# Patient Record
Sex: FEMALE | Race: WHITE | Employment: FULL TIME
[De-identification: names, ages, dates, MRNs, and addresses within clinical notes are randomized per-mention and may not be internally consistent; named-entity substitution may affect disease eponyms.]

---

## 2024-03-06 ENCOUNTER — HOSPITAL ENCOUNTER (OUTPATIENT)
Facility: HOSPITAL | Age: 59
Setting detail: RECURRING SERIES
Discharge: HOME | End: 2024-03-09
Payer: OTHER GOVERNMENT

## 2024-03-06 PROCEDURE — 97535 SELF CARE MNGMENT TRAINING: CPT

## 2024-03-06 PROCEDURE — 97162 PT EVAL MOD COMPLEX 30 MIN: CPT

## 2024-03-06 PROCEDURE — 97110 THERAPEUTIC EXERCISES: CPT

## 2024-03-06 NOTE — PROGRESS NOTES
PT DAILY TREATMENT NOTE/LYMPHARM EVAL 10-18    Patient Name: Zohreh Koenig    Date: 3/6/2024    : 1965  Insurance: Payor: [employer] E[employer] / Plan: [employer] / Product Type: *No Product type* /      Patient  verified yes     Visit #   Current / Total 1 24   Time   In / Out [phone]     TREATMENT AREA =  Lymphedema, not elsewhere classified [I89.0]    SUBJECTIVE  Pain Level (0-10 scale): 1/10  []constant []intermittent []improving []worsening []no change since onset    Any medication changes, allergies to medications, adverse drug reactions, diagnosis change, or new procedure performed?: [x] No    [] Yes (see summary sheet for update)  Subjective functional status/changes:     PLOF: functionally independent, no AD, active lifestyle  Limitations to PLOF: difficulty with working at keyboard, difficulty with lifting overhead, difficulty with walking dog,   Current symptoms/Complaints: 1/10 at best with rest; 6/10 at worst with sitting at computer; pt reports they are unable to sleep through the night secondary to pain  PMHx/Surgical Hx: Hashimoto disease s/p thyroid removal; Autoimmune disease,       [x] MRM [] Lumpectomy [] ALND +/-, [] SLNB +/-, [x] Reconstruction    Cancer treatments:  Surgical intervention only     Lymphedema History: [] Primary [x] Secondary Cause: Pt was diagnosis with breast cancer 2023 went through double mastectomy on 23 with 3 lymph nodes removed on left 3.  Pt reported that she opted for reconstruction with silicone implants which was performed on 24.  Pt reported that she was recovering well after first surgery but after reconstruction she is now having numbness and tingling everywhere down both arms but worse on the left.  She reports burning in the chest and pain at sternum as well as numbness and tingling in arms and fingers.  Pt reports that if she sits too long with hands on the keyboard she has increased pain and doesn't feel good.       Treatment history: 
circumference measurements of right arm by 5 cm in order to improve pts ability for dressing and pain.               Eval status:   Circumference measurements Left (cm)  Right (cm)   Thumb 7.3 6   2nd digit  5.5 5.5   3rd digit 51 5   4th digit 4.9 4.6   5th digit 4.5 4.5   Mid hand 17 16.4   Wrist  14.4 13.9   10 cm distal elbow 18 18.4   elbow 23 22   10 cm proximal elbow 18.1 19   Shoulder 31 30   Armpits  88     Chest  97     Xyphoid process 85               6.  Patient will be able to sit in a chair typing for an 8 hour work day with out increase in left arm pain in order to to return to full time work.                Eval status: unable to sit for more than 30 min with out increase in tingling.     Frequency / Duration: Patient to be seen 2 times per week for 16 treatments; then 1 time a week for 8 treatments for a total of 24 treatments    Patient/ Caregiver education and instruction: Diagnosis, prognosis, self care, activity modification, and exercises     [x]  Plan of care has been reviewed with PTA    Certification Period: LORELEI Mathew, PT 3/6/2024 7:41 AM  ____________________________________________________________________  I certify that the above Therapy Services are being furnished while the patient is under my care. I agree with the treatment plan and certify that this therapy is necessary.    Physician's Signature:____________________________Date:_________TIME:________                                      Yulia Sanchez MD  Insurance: Payor:  EAST / Plan:  EAST / Product Type: *No Product type* /      ** Signature, Date and Time must be completed for valid certification **    [facility]  [place] [address]  Ph [phone]  Fx [phone]

## 2024-03-12 ENCOUNTER — HOSPITAL ENCOUNTER (OUTPATIENT)
Facility: HOSPITAL | Age: 59
Setting detail: RECURRING SERIES
Discharge: HOME | End: 2024-03-15
Payer: OTHER GOVERNMENT

## 2024-03-12 PROCEDURE — 97535 SELF CARE MNGMENT TRAINING: CPT

## 2024-03-12 PROCEDURE — 97110 THERAPEUTIC EXERCISES: CPT

## 2024-03-12 PROCEDURE — 97112 NEUROMUSCULAR REEDUCATION: CPT

## 2024-03-12 NOTE — PROGRESS NOTES
PHYSICAL / OCCUPATIONAL THERAPY - DAILY TREATMENT NOTE     Patient Name: Zohreh Koenig    Date: 3/12/2024    : 1965  Insurance: Payor:  EAST / Plan:  EAST / Product Type: *No Product type* /      Patient  verified  YES     Visit #   Current / Total 2 24   Time   In / Out 438 540   Pain   In / Out 0.5 0.5   Subjective Functional Status/Changes: Pt reported that she loves how the postural exercises make her feel.  She also found another \"sport bra\" that compresses higher and she feels much more comfortable in that   Changes to:  Allergies, Med Hx, Sx Hx?   no       TREATMENT AREA =  Lymphedema, not elsewhere classified [I89.0]    OBJECTIVE    Therapeutic Procedures:  Tx Min Billable or 1:1 Min (if diff from Tx Min) Procedure, Rationale, Specifics   29 29 45489 Therapeutic Exercise (timed):  increase ROM, strength, coordination, balance, and proprioception to improve patient's ability to progress to PLOF and address remaining functional goals. (see flow sheet as applicable)    Details if applicable:  self MLD, wand exercises, AROM to 90 with 2lb weight shoulder flexion/scap     8 8 27189 Neuromuscular Re-Education (timed):  improve balance, coordination, kinesthetic sense, posture, core stability and proprioception to improve patient's ability to develop conscious control of individual muscles and awareness of position of extremities in order to progress to PLOF and address remaining functional goals. (see flow sheet as applicable)    Details if applicable:  diaphragmatic breathing   25 25 04542 Self Care/Home Management (timed):  improve patient knowledge and understanding of activity modification, diagnosis/prognosis, and physical therapy expectations, procedures and progression  to improve patient's ability to progress to PLOF and address remaining functional goals.  (see flow sheet as applicable)     Details if applicable:     62 62 [facility] Totals Reminder: bill using total billable min of  Patent

## 2024-03-14 ENCOUNTER — HOSPITAL ENCOUNTER (OUTPATIENT)
Facility: HOSPITAL | Age: 59
Setting detail: RECURRING SERIES
Discharge: HOME | End: 2024-03-17
Payer: OTHER GOVERNMENT

## 2024-03-14 PROCEDURE — 97140 MANUAL THERAPY 1/> REGIONS: CPT

## 2024-03-14 PROCEDURE — 97535 SELF CARE MNGMENT TRAINING: CPT

## 2024-03-14 PROCEDURE — 97110 THERAPEUTIC EXERCISES: CPT

## 2024-03-14 NOTE — PROGRESS NOTES
to improve functional tolerance for exercise.   Eval Status: LLIS 35 % disability     4.   Patient will improve pain in left arm to 3/10 at worst to improve sitting tolerance and restore prior level of function.  Eval Status: 6/10 at worst     5.   Pt will be independent with teach back of lymphedema risk reduction techniques in order to self manage lymphedema.  Eval Status: given today      6.   Pt will have painfree left shoulder AROM to aid in functional mechanics for ADLs.  Eval Status:   Shoulder Left Right   Flexion 155 P! 160    160   ER T3 T3   IR T9 T9    3/14/24: PROGRESSING   Shoulder Left Right   Flexion 160 P! 160    160        Long Term Goals: To be accomplished in 24 treatments::  Patient will improve FOTO score by 12 points to improve functional tolerance for ADLs and IADLs.  Eval Status: FOTO 45  FOTO score = an established functional score where 100 = no disability     2.   Pt will be able to sleep in bed with out increased pain in order to return to proper sleeping arrangements   Eval Status: sleeps in recliner with 2 pillows under arms      3.   Pt will have 5/5 neal UE strength to return to goals of improved overhead reaching and lifting.  Eval Status:   Shoulder Left (1-5) Right (1-5)   Shoulder Flexion 4- 4   Shoulder ABD 3+ 4   Shoulder IR 3+ 5   Shoulder ER 3+ 4            4.   Pt will be able to zo/doff compression garment with mod ind in order to self manage lymphedema in the future.               Eval status: to be assessed at future visit            5.   Pt will decrease total circumference measurements of right arm by 5 cm in order to improve pts ability for dressing and pain.               Eval status:   Circumference measurements Left (cm)  Right (cm)   Thumb 7.3 6   2nd digit  5.5 5.5   3rd digit 51 5   4th digit 4.9 4.6   5th digit 4.5 4.5   Mid hand 17 16.4   Wrist  14.4 13.9   10 cm distal elbow 18 18.4   elbow 23 22   10 cm proximal elbow 18.1 19   Shoulder 31 30

## 2024-03-19 ENCOUNTER — HOSPITAL ENCOUNTER (OUTPATIENT)
Facility: HOSPITAL | Age: 59
Setting detail: RECURRING SERIES
Discharge: HOME | End: 2024-03-22
Payer: OTHER GOVERNMENT

## 2024-03-19 PROCEDURE — 97112 NEUROMUSCULAR REEDUCATION: CPT

## 2024-03-19 PROCEDURE — 97535 SELF CARE MNGMENT TRAINING: CPT

## 2024-03-19 PROCEDURE — 97110 THERAPEUTIC EXERCISES: CPT

## 2024-03-19 NOTE — PROGRESS NOTES
pain in left arm to 3/10 at worst to improve sitting tolerance and restore prior level of function.  Eval Status: 6/10 at worst     5.   Pt will be independent with teach back of lymphedema risk reduction techniques in order to self manage lymphedema.  Eval Status: given today      6.   Pt will have painfree left shoulder AROM to aid in functional mechanics for ADLs.  Eval Status:   Shoulder Left Right   Flexion 155 P! 160    160   ER T3 T3   IR T9 T9    3/14/24: PROGRESSING   Shoulder Left Right   Flexion 160 P! 160    160         Long Term Goals: To be accomplished in 24 treatments::  Patient will improve FOTO score by 12 points to improve functional tolerance for ADLs and IADLs.  Eval Status: FOTO 45  FOTO score = an established functional score where 100 = no disability     2.   Pt will be able to sleep in bed with out increased pain in order to return to proper sleeping arrangements   Eval Status: sleeps in recliner with 2 pillows under arms      3.   Pt will have 5/5 neal UE strength to return to goals of improved overhead reaching and lifting.  Eval Status:   Shoulder Left (1-5) Right (1-5)   Shoulder Flexion 4- 4   Shoulder ABD 3+ 4   Shoulder IR 3+ 5   Shoulder ER 3+ 4            4.   Pt will be able to zo/doff compression garment with mod ind in order to self manage lymphedema in the future.               Eval status: to be assessed at future visit            5.   Pt will decrease total circumference measurements of right arm by 5 cm in order to improve pts ability for dressing and pain.               Eval status:   Circumference measurements Left (cm)  Right (cm)   Thumb 7.3 6   2nd digit  5.5 5.5   3rd digit 51 5   4th digit 4.9 4.6   5th digit 4.5 4.5   Mid hand 17 16.4   Wrist  14.4 13.9   10 cm distal elbow 18 18.4   elbow 23 22   10 cm proximal elbow 18.1 19   Shoulder 31 30   Armpits  88     Chest  97     Xyphoid process 85       3/19/24  Circumference measurements Left (cm)  Right

## 2024-03-21 ENCOUNTER — HOSPITAL ENCOUNTER (OUTPATIENT)
Facility: HOSPITAL | Age: 59
Setting detail: RECURRING SERIES
Discharge: HOME | End: 2024-03-24
Payer: OTHER GOVERNMENT

## 2024-03-21 PROCEDURE — 97112 NEUROMUSCULAR REEDUCATION: CPT

## 2024-03-21 PROCEDURE — 97110 THERAPEUTIC EXERCISES: CPT

## 2024-03-21 PROCEDURE — 97535 SELF CARE MNGMENT TRAINING: CPT

## 2024-03-21 PROCEDURE — 97140 MANUAL THERAPY 1/> REGIONS: CPT

## 2024-03-21 NOTE — PROGRESS NOTES
PHYSICAL / OCCUPATIONAL THERAPY - DAILY TREATMENT NOTE     Patient Name: Zohreh Koenig    Date: 3/21/2024    : 1965  Insurance: Payor:  EAST / Plan:  EAST / Product Type: *No Product type* /      Patient  verified Yes     Visit #   Current / Total 5 24   Time   In / Out 426 537   Pain   In / Out 0 0   Subjective Functional Status/Changes: Pt reported that she had no pain today and worked all day   Changes to:  Allergies, Med Hx, Sx Hx?   no       TREATMENT AREA =  Lymphedema, not elsewhere classified [I89.0]    OBJECTIVE    Therapeutic Procedures:  Tx Min Billable or 1:1 Min (if diff from Tx Min) Procedure, Rationale, Specifics   27 27 01279 Therapeutic Exercise (timed):  increase ROM, strength, coordination, balance, and proprioception to improve patient's ability to progress to PLOF and address remaining functional goals. (see flow sheet as applicable)    Details if applicable:  self MLD, wand exercises, AROM, beach chair     13 13 42335 Neuromuscular Re-Education (timed):  improve balance, coordination, kinesthetic sense, posture, core stability and proprioception to improve patient's ability to develop conscious control of individual muscles and awareness of position of extremities in order to progress to PLOF and address remaining functional goals. (see flow sheet as applicable)    Details if applicable:  rows, ext, serratus punch (scapular stabilization   8 8 09114 Self Care/Home Management (timed):  improve patient knowledge and understanding of physical therapy expectations, procedures and progression and long term management  to improve patient's ability to progress to PLOF and address remaining functional goals.  (see flow sheet as applicable)     Details if applicable:  education long ter management    23 23 11164 Manual Therapy (timed):  decrease pain, increase ROM, increase tissue extensibility, and decrease edema to improve patient's ability to progress to PLOF and address

## 2024-03-26 ENCOUNTER — HOSPITAL ENCOUNTER (OUTPATIENT)
Facility: HOSPITAL | Age: 59
Setting detail: RECURRING SERIES
Discharge: HOME | End: 2024-03-29
Payer: OTHER GOVERNMENT

## 2024-03-26 PROCEDURE — 97140 MANUAL THERAPY 1/> REGIONS: CPT

## 2024-03-26 PROCEDURE — 97535 SELF CARE MNGMENT TRAINING: CPT

## 2024-03-26 PROCEDURE — 97110 THERAPEUTIC EXERCISES: CPT

## 2024-03-26 NOTE — PROGRESS NOTES
PHYSICAL / OCCUPATIONAL THERAPY - DAILY TREATMENT NOTE     Patient Name: Zohreh Koenig    Date: 3/26/2024    : 1965  Insurance: Payor:  EAST / Plan:  EAST / Product Type: *No Product type* /      Patient  verified Yes     Visit #   Current / Total 6 24   Time   In / Out 430 510   Pain   In / Out 0 0   Subjective Functional Status/Changes: Pt reported that she has an appointments on  for fitting her garments.  Pt reported that she is sleeping in her bed now.    Changes to:  Allergies, Med Hx, Sx Hx?   no       TREATMENT AREA =  Lymphedema, not elsewhere classified [I89.0]    OBJECTIVE    Therapeutic Procedures:  Tx Min Billable or 1:1 Min (if diff from Tx Min) Procedure, Rationale, Specifics   8 8 37887 Therapeutic Exercise (timed):  increase ROM, strength, coordination, balance, and proprioception to improve patient's ability to progress to PLOF and address remaining functional goals. (see flow sheet as applicable)    Details if applicable:        16218 Self Care/Home Management (timed):  improve patient knowledge and understanding of activity modification and diagnosis/prognosis  to improve patient's ability to progress to PLOF and address remaining functional goals.  (see flow sheet as applicable)    Details if applicable:      92670 Manual Therapy (timed):  decrease pain, increase ROM, increase tissue extensibility, and decrease edema to improve patient's ability to progress to PLOF and address remaining functional goals.  The manual therapy interventions were performed at a separate and distinct time from the therapeutic activities interventions . Details:  long thoracic duct, short neck sequence, right axial nodes, left inguinal nodes, left to right axial anastomosis, left axial-inguinal anastomosis, abdominal sequence, left upper arm, using semi circles, pumps and scoops         Details if applicable:     40 40 [facility] Totals Reminder: bill using total billable min

## 2024-03-28 ENCOUNTER — APPOINTMENT (OUTPATIENT)
Facility: HOSPITAL | Age: 59
End: 2024-03-28
Payer: OTHER GOVERNMENT

## 2024-03-28 ENCOUNTER — TELEPHONE (OUTPATIENT)
Facility: HOSPITAL | Age: 59
End: 2024-03-28

## 2024-04-02 ENCOUNTER — HOSPITAL ENCOUNTER (OUTPATIENT)
Facility: HOSPITAL | Age: 59
Setting detail: RECURRING SERIES
Discharge: HOME | End: 2024-04-05
Payer: OTHER GOVERNMENT

## 2024-04-02 PROCEDURE — 97110 THERAPEUTIC EXERCISES: CPT

## 2024-04-02 PROCEDURE — 97140 MANUAL THERAPY 1/> REGIONS: CPT

## 2024-04-02 PROCEDURE — 97112 NEUROMUSCULAR REEDUCATION: CPT

## 2024-04-02 NOTE — PROGRESS NOTES
18.4   elbow 23 22   10 cm proximal elbow 18.1 19   Shoulder 31 30   Armpits  88     Chest  97     Xyphoid process 85                  3/19/24  Circumference measurements Left (cm)  Right (cm)   Thumb 5.8 6   2nd digit  5.3 5.5   3rd digit 4.8 5   4th digit 4.5 4.6   5th digit 4.3 4.5   Mid hand 16.8 16.4   Wrist  14.1 13.9   10 cm distal elbow 18.4 18.4   elbow 23 22   10 cm proximal elbow 17 19   Shoulder 30 30   Armpits  89     Chest  97     Xyphoid process 84.5               6.  Patient will be able to sit in a chair typing for an 8 hour work day with out increase in left arm pain in order to to return to full time work.                Eval status: unable to sit for more than 30 min with out increase in tingling.     PLAN  Yes  Continue plan of care  []  Upgrade activities as tolerated  []  Discharge due to :  []  Other:    Alina Mathew PT    4/2/2024    3:28 PM    Future Appointments   Date Time Provider Department Center   4/2/2024  4:30 PM Slack, Alina, PT [facility]   4/4/2024  3:50 PM Slack, Alina, PT [facility]   4/9/2024  3:10 PM Slack, Alina, PT [facility]   4/11/2024  3:50 PM Slack, Alina, PT [facility]   4/16/2024  3:50 PM Slack, Alina, PT [facility]   4/18/2024  4:30 PM Slack, Alina, PT [facility]   4/23/2024  3:50 PM Slack, Alina, PT [facility]   4/25/2024  4:30 PM Slack, Alina, PT [facility]   4/30/2024  3:50 PM Slack, Alina, PT [facility]   5/2/2024  3:50 PM Slack, Alina, PT [facility]   5/7/2024  3:50 PM Slack, Alina, PT [facility]   5/14/2024  3:50 PM Slack, Alina, PT [facility]   5/21/2024  3:50 PM Alina Mathew, PT [facility]   5/28/2024  3:50 PM Alina Mathew, PT [facility]   6/4/2024  3:50 PM Alina Mathew, PT [facility]   6/11/2024  3:50 PM Alina Mathew, PT [facility]   6/18/2024  3:50 PM Alina Mathew, PT [facility]   6/25/2024  3:50 PM Alina Mathew, PT [facility]

## 2024-04-04 ENCOUNTER — TELEPHONE (OUTPATIENT)
Facility: HOSPITAL | Age: 59
End: 2024-04-04

## 2024-04-04 ENCOUNTER — APPOINTMENT (OUTPATIENT)
Facility: HOSPITAL | Age: 59
End: 2024-04-04
Payer: OTHER GOVERNMENT

## 2024-04-09 ENCOUNTER — APPOINTMENT (OUTPATIENT)
Facility: HOSPITAL | Age: 59
End: 2024-04-09
Payer: OTHER GOVERNMENT

## 2024-04-11 ENCOUNTER — HOSPITAL ENCOUNTER (OUTPATIENT)
Facility: HOSPITAL | Age: 59
Setting detail: RECURRING SERIES
Discharge: HOME | End: 2024-04-14
Payer: OTHER GOVERNMENT

## 2024-04-11 PROCEDURE — 97140 MANUAL THERAPY 1/> REGIONS: CPT

## 2024-04-11 PROCEDURE — 97530 THERAPEUTIC ACTIVITIES: CPT

## 2024-04-11 NOTE — PROGRESS NOTES
[facility]  [address] [place] [address]  Ph [phone]  Fx [phone]    Physical Therapy Progress Note  Patient name: Zohreh Koenig Start of Care: 3/6/2024   Referral source: Yulia Sanchez MD : 1965               Medical Diagnosis: Lymphedema, not elsewhere classified [I89.0]    Onset Date:24               Treatment Diagnosis: I89.0 Lymphedema, not elsewhere classified     Prior Hospitalization: see medical history Provider#: [id number]   Medications: Verified on Patient summary List   Comorbidities: Hashimoto disease s/p thyroid removal; Autoimmune disease,    Prior Level of Function: functionally independent, no AD, active lifestyle     Visits from Start of Care: 8    Missed Visits: 2    Updated Goals/Measure of Progress: To be achieved in 24 treatments:    Short Term Goals: To be accomplished in 12 treatments:  Patient will be independent and compliant with HEP to progress toward goals and restore functional mobility.   Eval Status: gave HEP today for posture  PN 3/26/24: pt reported daily performance GOAL MET            2.  Patient will be independent and compliant with Self MLD to progress toward goals and improve independent management of Lymphedema.     Eval Status: to be issued at next visit  PN 24: reviewed today      3.   Patient will improve LLIS score by 10 % to improve functional tolerance for exercise.   Eval Status: LLIS 35 % disability  PN 24: 19 % GOAL MET      4.   Patient will improve pain in left arm to 3/10 at worst to improve sitting tolerance and restore prior level of function.  Eval Status: 6/10 at worst  PN 3/21/24: 1/10 at worst GOAL MET      5.   Pt will be independent with teach back of lymphedema risk reduction techniques in order to self manage lymphedema.  Eval Status: given today   PN 24: reviewed today      6.   Pt will have painfree left shoulder AROM to aid in functional mechanics for ADLs.  Eval 
4/11/24: 19 % GOAL MET      4.   Patient will improve pain in left arm to 3/10 at worst to improve sitting tolerance and restore prior level of function.  Eval Status: 6/10 at worst  PN 3/21/24: 1/10 at worst GOAL MET      5.   Pt will be independent with teach back of lymphedema risk reduction techniques in order to self manage lymphedema.  Eval Status: given today   PN 4/11/24: reviewed today      6.   Pt will have painfree left shoulder AROM to aid in functional mechanics for ADLs.  Eval Status:   Shoulder Left Right   Flexion 155 P! 160    160   ER T3 T3   IR T9 T9    PN 4/11/24: Progressing   Shoulder Left   Flexion 160 P!            Long Term Goals: To be accomplished in 24 treatments::  Patient will improve FOTO score by 12 points to improve functional tolerance for ADLs and IADLs.  Eval Status: FOTO 45  PN 4/11/24: to be taken at future date   FOTO score = an established functional score where 100 = no disability     2.   Pt will be able to sleep in bed with out increased pain in order to return to proper sleeping arrangements   Eval Status: sleeps in recliner with 2 pillows under arms   PN 4/2/24: Pt reports sleeping in bed with body pillow GOAL MET      3.   Pt will have 5/5 neal UE strength to return to goals of improved overhead reaching and lifting.  Eval Status:   Shoulder Left (1-5) Right (1-5) Left 4/11/24 Right 4/11/24   Shoulder Flexion 4- 4 4 4   Shoulder ABD 3+ 4 3+ 4   Shoulder IR 3+ 5 4 5   Shoulder ER 3+ 4 3+ 4   PN 4/11/24: progressing            4.   Pt will be able to zo/doff compression garment with mod ind in order to self manage lymphedema in the future.               Eval status: to be assessed at future visit   PN 4/11/24: is getting fit on Tuesday 5.   Pt will decrease total circumference measurements of right arm by 5 cm in order to improve pts ability for dressing and pain.               Eval status:   Circumference measurements Left (cm)  Right (cm)

## 2024-04-16 ENCOUNTER — APPOINTMENT (OUTPATIENT)
Facility: HOSPITAL | Age: 59
End: 2024-04-16
Payer: OTHER GOVERNMENT

## 2024-04-16 ENCOUNTER — TELEPHONE (OUTPATIENT)
Facility: HOSPITAL | Age: 59
End: 2024-04-16

## 2024-04-16 NOTE — TELEPHONE ENCOUNTER
Pt called to cxl due to meeting with [facility] at [facility] in [facility], and does not think she will be back in time for her appt

## 2024-04-18 ENCOUNTER — HOSPITAL ENCOUNTER (OUTPATIENT)
Facility: HOSPITAL | Age: 59
Setting detail: RECURRING SERIES
Discharge: HOME | End: 2024-04-21
Payer: OTHER GOVERNMENT

## 2024-04-18 PROCEDURE — 97140 MANUAL THERAPY 1/> REGIONS: CPT

## 2024-04-18 PROCEDURE — 97535 SELF CARE MNGMENT TRAINING: CPT

## 2024-04-18 PROCEDURE — 97110 THERAPEUTIC EXERCISES: CPT

## 2024-04-18 NOTE — PROGRESS NOTES
PHYSICAL / OCCUPATIONAL THERAPY - DAILY TREATMENT NOTE     Patient Name: Zohreh Koenig    Date: 2024    : 1965  Insurance: Payor: [employer] EAST / Plan: [employer] EAST / Product Type: *No Product type* /      Patient  verified Yes     Visit #   Current / Total 9 24   Time   In / Out 430 534   Pain   In / Out 3 0   Subjective Functional Status/Changes: Pt reported that she went to [facility] and wanted to know about lymphatic pump.  She also wanted to know about other sleeves and gloves that she can get.    Changes to:  Allergies, Med Hx, Sx Hx?   no       TREATMENT AREA =  Lymphedema, not elsewhere classified [I89.0]    OBJECTIVE    Therapeutic Procedures:  Tx Min Billable or 1:1 Min (if diff from Tx Min) Procedure, Rationale, Specifics   10 10 30185 Therapeutic Exercise (timed):  increase ROM, strength, coordination, balance, and proprioception to improve patient's ability to progress to PLOF and address remaining functional goals. (see flow sheet as applicable)    Details if applicable:        91859 Self Care/Home Management (timed):  improve patient knowledge and understanding of pain reducing techniques, posture/ergonomics, diagnosis/prognosis, physical therapy expectations, procedures and progression, and long term lymphedema management  to improve patient's ability to progress to PLOF and address remaining functional goals.  (see flow sheet as applicable)    Details if applicable:      17456 Manual Therapy (timed):  decrease pain, increase ROM, increase tissue extensibility, and decrease edema to improve patient's ability to progress to PLOF and address remaining functional goals.  The manual therapy interventions were performed at a separate and distinct time from the therapeutic activities interventions . Details:  long thoracic duct, short neck sequence, right axial nodes, left inguinal nodes, left to right axial anastomosis, left axial-inguinal anastomosis, abdominal sequence,

## 2024-04-23 ENCOUNTER — HOSPITAL ENCOUNTER (OUTPATIENT)
Facility: HOSPITAL | Age: 59
Setting detail: RECURRING SERIES
Discharge: HOME | End: 2024-04-26
Payer: OTHER GOVERNMENT

## 2024-04-23 PROCEDURE — 97110 THERAPEUTIC EXERCISES: CPT

## 2024-04-23 PROCEDURE — 97140 MANUAL THERAPY 1/> REGIONS: CPT

## 2024-04-23 NOTE — PROGRESS NOTES
aid in functional mechanics for ADLs.  Eval Status:   Shoulder Left Right   Flexion 155 P! 160    160   ER T3 T3   IR T9 T9    PN 4/11/24: Progressing   Shoulder Left   Flexion 160 P!            Long Term Goals: To be accomplished in 24 treatments::  Patient will improve FOTO score by 12 points to improve functional tolerance for ADLs and IADLs.  Eval Status: FOTO 45  PN 4/11/24: to be taken at future date   FOTO score = an established functional score where 100 = no disability     2.   Pt will be able to sleep in bed with out increased pain in order to return to proper sleeping arrangements   Eval Status: sleeps in recliner with 2 pillows under arms   PN 4/2/24: Pt reports sleeping in bed with body pillow GOAL MET      3.   Pt will have 5/5 neal UE strength to return to goals of improved overhead reaching and lifting.  Eval Status:   Shoulder Left (1-5) Right (1-5) Left 4/11/24 Right 4/11/24   Shoulder Flexion 4- 4 4 4   Shoulder ABD 3+ 4 3+ 4   Shoulder IR 3+ 5 4 5   Shoulder ER 3+ 4 3+ 4   PN 4/11/24: progressing            4.   Pt will be able to zo/doff compression garment with mod ind in order to self manage lymphedema in the future.               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            5.   Pt will decrease total circumference measurements of right arm by 5 cm in order to improve pts ability for dressing and pain.               Eval status:   Circumference measurements Left (cm)  Right (cm)   Thumb 7.3 6   2nd digit  5.5 5.5   3rd digit 5.1 5   4th digit 4.9 4.6   5th digit 4.5 4.5   Mid hand 17 16.4   Wrist  14.4 13.9   10 cm distal elbow 18 18.4   elbow 23 22   10 cm proximal elbow 28.1 29   Shoulder 31 30   Armpits  88     Chest  97     Xyphoid process 85                  PN 4/11/24: progressing   Circumference measurements Left (cm)  Right (cm)   Thumb 5.2 6   2nd

## 2024-04-25 ENCOUNTER — HOSPITAL ENCOUNTER (OUTPATIENT)
Facility: HOSPITAL | Age: 59
Setting detail: RECURRING SERIES
Discharge: HOME | End: 2024-04-28
Payer: OTHER GOVERNMENT

## 2024-04-25 PROCEDURE — 97110 THERAPEUTIC EXERCISES: CPT

## 2024-04-25 PROCEDURE — 97140 MANUAL THERAPY 1/> REGIONS: CPT

## 2024-04-25 NOTE — PROGRESS NOTES
to aid in functional mechanics for ADLs.  Eval Status:   Shoulder Left Right   Flexion 155 P! 160    160   ER T3 T3   IR T9 T9    PN 4/11/24: Progressing   Shoulder Left   Flexion 160 P!            Long Term Goals: To be accomplished in 24 treatments::  Patient will improve FOTO score by 12 points to improve functional tolerance for ADLs and IADLs.  Eval Status: FOTO 45  PN 4/11/24: to be taken at future date   FOTO score = an established functional score where 100 = no disability     2.   Pt will be able to sleep in bed with out increased pain in order to return to proper sleeping arrangements   Eval Status: sleeps in recliner with 2 pillows under arms   PN 4/2/24: Pt reports sleeping in bed with body pillow GOAL MET      3.   Pt will have 5/5 neal UE strength to return to goals of improved overhead reaching and lifting.  Eval Status:   Shoulder Left (1-5) Right (1-5) Left 4/11/24 Right 4/11/24   Shoulder Flexion 4- 4 4 4   Shoulder ABD 3+ 4 3+ 4   Shoulder IR 3+ 5 4 5   Shoulder ER 3+ 4 3+ 4   PN 4/11/24: progressing            4.   Pt will be able to zo/doff compression garment with mod ind in order to self manage lymphedema in the future.               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            5.   Pt will decrease total circumference measurements of right arm by 5 cm in order to improve pts ability for dressing and pain.               Eval status:   Circumference measurements Left (cm)  Right (cm)   Thumb 7.3 6   2nd digit  5.5 5.5   3rd digit 5.1 5   4th digit 4.9 4.6   5th digit 4.5 4.5   Mid hand 17 16.4   Wrist  14.4 13.9   10 cm distal elbow 18 18.4   elbow 23 22   10 cm proximal elbow 28.1 29   Shoulder 31 30   Armpits  88     Chest  97     Xyphoid process 85                  PN 4/11/24: progressing   Circumference measurements Left (cm)  Right (cm)   Thumb 5.2 6

## 2024-04-30 ENCOUNTER — HOSPITAL ENCOUNTER (OUTPATIENT)
Facility: HOSPITAL | Age: 59
Setting detail: RECURRING SERIES
Discharge: HOME | End: 2024-05-03
Payer: OTHER GOVERNMENT

## 2024-04-30 PROCEDURE — 97530 THERAPEUTIC ACTIVITIES: CPT

## 2024-04-30 PROCEDURE — 97140 MANUAL THERAPY 1/> REGIONS: CPT

## 2024-04-30 NOTE — PROGRESS NOTES
PHYSICAL / OCCUPATIONAL THERAPY - DAILY TREATMENT NOTE     Patient Name: Zohreh Koenig    Date: 2024    : 1965  Insurance: Payor:  EAST / Plan: [employer] EAST / Product Type: *No Product type* /      Patient  verified Yes     Visit #   Current / Total 12 24   Time   In / Out 351 435   Pain   In / Out .5 0   Subjective Functional Status/Changes: Pt reported that she had her garments in place when she was working this weekend and she started having increased numbness and tingling after working all day but exercises helped her feel better.  Pt also reported that she is having right lower quadrant pain after she urinated last night and it didn't go away.  PT instructed pt to see MD pt stated she is going to go to urgent care. PT educated to go to ER with increase in pain.     Changes to:  Allergies, Med Hx, Sx Hx?   yes       TREATMENT AREA =  Lymphedema, not elsewhere classified [I89.0]    OBJECTIVE  Therapeutic Procedures:  Tx Min Billable or 1:1 Min (if diff from Tx Min) Procedure, Rationale, Specifics   5 5 54810 Therapeutic Exercise (timed):  increase ROM, strength, coordination, balance, and proprioception to improve patient's ability to progress to PLOF and address remaining functional goals. (see flow sheet as applicable)    Details if applicable:       10 10 02349 Therapeutic Activity (timed):  use of dynamic activities replicating functional movements to increase ROM, strength, coordination, balance, and proprioception in order to improve patient's ability to progress to PLOF and address remaining functional goals.  (see flow sheet as applicable)    Details if applicable:  garment donning doffing    29 77 [id number] Manual Therapy (timed):  decrease pain, increase ROM, increase tissue extensibility, and decrease edema to improve patient's ability to progress to PLOF and address remaining functional goals.  The manual therapy interventions were performed at a separate and distinct time from

## 2024-05-02 ENCOUNTER — HOSPITAL ENCOUNTER (OUTPATIENT)
Facility: HOSPITAL | Age: 59
Setting detail: RECURRING SERIES
End: 2024-05-02
Payer: OTHER GOVERNMENT

## 2024-05-02 ENCOUNTER — TELEPHONE (OUTPATIENT)
Facility: HOSPITAL | Age: 59
End: 2024-05-02

## 2024-05-02 NOTE — TELEPHONE ENCOUNTER
Pt called and canceled visit for today due to having an Ultrasound this afternoon for her abdomen pain    Sonogram not done

## 2024-05-06 ENCOUNTER — TELEPHONE (OUTPATIENT)
Facility: HOSPITAL | Age: 59
End: 2024-05-06

## 2024-05-07 ENCOUNTER — APPOINTMENT (OUTPATIENT)
Facility: HOSPITAL | Age: 59
End: 2024-05-07
Payer: OTHER GOVERNMENT

## 2024-05-14 ENCOUNTER — HOSPITAL ENCOUNTER (OUTPATIENT)
Facility: HOSPITAL | Age: 59
Setting detail: RECURRING SERIES
Discharge: HOME | End: 2024-05-17
Payer: OTHER GOVERNMENT

## 2024-05-14 PROCEDURE — 97110 THERAPEUTIC EXERCISES: CPT

## 2024-05-14 PROCEDURE — 97140 MANUAL THERAPY 1/> REGIONS: CPT

## 2024-05-14 PROCEDURE — 97535 SELF CARE MNGMENT TRAINING: CPT

## 2024-05-14 NOTE — PROGRESS NOTES
[facility]  [address] [place] [address]  Ph [phone]  Fx [phone]    Physical Therapy Progress Note  Patient name: Zohreh Koenig Start of Care: 3/6/2024   Referral source: Yulia Sanchez MD : 1965               Medical Diagnosis: Lymphedema, not elsewhere classified [I89.0]    Onset Date:24               Treatment Diagnosis: I89.0 Lymphedema, not elsewhere classified     Prior Hospitalization: see medical history Provider#: [id number]   Medications: Verified on Patient summary List   Comorbidities: Hashimoto disease s/p thyroid removal; Autoimmune disease,    Prior Level of Function: functionally independent, no AD, active lifestyle     Visits from Start of Care: 13    Missed Visits: 5    Updated Goals/Measure of Progress: To be achieved in 24 treatments:    Short Term Goals: To be accomplished in 12 treatments:  Patient will be independent and compliant with HEP to progress toward goals and restore functional mobility.   Eval Status: gave HEP today for posture  PN 3/26/24: pt reported daily performance GOAL MET            2.  Patient will be independent and compliant with Self MLD to progress toward goals and improve independent management of Lymphedema.     Eval Status: to be issued at next visit  PN 24: reviewed today   PN 24: Pt able to perform and PT gave spot treatments during the day while Pt uses compression      3.   Patient will improve LLIS score by 10 % to improve functional tolerance for exercise.   Eval Status: LLIS 35 % disability  PN 24: 19 % GOAL MET      4.   Patient will improve pain in left arm to 3/10 at worst to improve sitting tolerance and restore prior level of function.  Eval Status: 6/10 at worst  PN 3/21/24: 1/10 at worst GOAL MET      5.   Pt will be independent with teach back of lymphedema risk reduction techniques in order to self manage lymphedema.  Eval Status: given today   PN 24: reviewed today

## 2024-05-14 NOTE — PROGRESS NOTES
PHYSICAL / OCCUPATIONAL THERAPY - DAILY TREATMENT NOTE     Patient Name: Zohreh Koenig    Date: 2024    : 1965  Insurance: Payor: [employer] EAST / Plan:  EAST PRIME / Product Type: *No Product type* /      Patient  verified Yes     Visit #   Current / Total 13 24   Time   In / Out 355 435   Pain   In / Out 0 0   Subjective Functional Status/Changes: Pt reported that [facility] stated that she can't get garments until her MD comes back from maternity leave.    Changes to:  Allergies, Med Hx, Sx Hx?   no       TREATMENT AREA =  Lymphedema, not elsewhere classified [I89.0]    OBJECTIVE    Therapeutic Procedures:  Tx Min Billable or 1:1 Min (if diff from Tx Min) Procedure, Rationale, Specifics   8 8 83866 Therapeutic Exercise (timed):  increase ROM, strength, coordination, balance, and proprioception to improve patient's ability to progress to PLOF and address remaining functional goals. (see flow sheet as applicable)    Details if applicable:  added lat pull downs on wall today       77236 Manual Therapy (timed):  decrease pain, increase ROM, increase tissue extensibility, and decrease edema to improve patient's ability to progress to PLOF and address remaining functional goals.  The manual therapy interventions were performed at a separate and distinct time from the therapeutic activities interventions . Details: short neck sequence, right axial nodes, mid sagittal axial anastomosis, left axial nodes abdominal sequence, left upper back, left chest, using semi circles, pumps and scoops          Details if applicable:     10 10 13276 Self Care/Home Management (timed):  improve patient knowledge and understanding of diagnosis/prognosis, physical therapy expectations, procedures and progression, and long term management of lymphedema   to improve patient's ability to progress to PLOF and address remaining functional goals.  (see flow sheet as applicable)     Details if applicable:     40

## 2024-05-21 ENCOUNTER — HOSPITAL ENCOUNTER (OUTPATIENT)
Facility: HOSPITAL | Age: 59
Setting detail: RECURRING SERIES
Discharge: HOME | End: 2024-05-24
Payer: OTHER GOVERNMENT

## 2024-05-21 PROCEDURE — 97530 THERAPEUTIC ACTIVITIES: CPT

## 2024-05-21 PROCEDURE — 97140 MANUAL THERAPY 1/> REGIONS: CPT

## 2024-05-21 PROCEDURE — 97112 NEUROMUSCULAR REEDUCATION: CPT

## 2024-05-21 NOTE — PROGRESS NOTES
PHYSICAL / OCCUPATIONAL THERAPY - DAILY TREATMENT NOTE     Patient Name: Zohreh Koenig    Date: 2024    : 1965  Insurance: Payor:  EAST / Plan:  EAST PRIME / Product Type: *No Product type* /      Patient  verified Yes     Visit #   Current / Total 14 24   Time   In / Out 354 435   Pain   In / Out 0 0   Subjective Functional Status/Changes: Pt reported that her exercises are working to help her pain    Changes to:  Allergies, Med Hx, Sx Hx?   no       TREATMENT AREA =  Lymphedema, not elsewhere classified [I89.0]    OBJECTIVE    Therapeutic Procedures:  Tx Min Billable or 1:1 Min (if diff from Tx Min) Procedure, Rationale, Specifics   10 10 04237 Therapeutic Activity (timed):  use of dynamic activities replicating functional movements to increase ROM, strength, coordination, balance, and proprioception in order to improve patient's ability to progress to PLOF and address remaining functional goals.  (see flow sheet as applicable)    Details if applicable:  garment education donning and doffing bra      23  25307 Manual Therapy (timed):  decrease pain, increase ROM, increase tissue extensibility, and decrease edema to improve patient's ability to progress to PLOF and address remaining functional goals.  The manual therapy interventions were performed at a separate and distinct time from the therapeutic activities interventions . Details:  short neck sequence, right axial nodes, mid sagittal axial anastomosis, left axial nodes abdominal sequence, left upper back, left chest, using semi circles, pumps and scoops     Details if applicable:     8  32130 Neuromuscular Re-Education (timed):  improve balance, coordination, kinesthetic sense, posture, core stability and proprioception to improve patient's ability to develop conscious control of individual muscles and awareness of position of extremities in order to progress to PLOF and address remaining functional goals. (see flow sheet as

## 2024-05-28 ENCOUNTER — TELEPHONE (OUTPATIENT)
Facility: HOSPITAL | Age: 59
End: 2024-05-28

## 2024-05-28 ENCOUNTER — HOSPITAL ENCOUNTER (OUTPATIENT)
Facility: HOSPITAL | Age: 59
Setting detail: RECURRING SERIES
Discharge: HOME | End: 2024-05-31
Payer: OTHER GOVERNMENT

## 2024-05-28 PROCEDURE — 97140 MANUAL THERAPY 1/> REGIONS: CPT

## 2024-05-28 PROCEDURE — 97110 THERAPEUTIC EXERCISES: CPT

## 2024-05-28 PROCEDURE — 97535 SELF CARE MNGMENT TRAINING: CPT

## 2024-05-28 NOTE — TELEPHONE ENCOUNTER
[facility] c[facility] due to no auth, pt is working on getting auth, & [facility] is putting pt on 30 day medical hold

## 2024-05-28 NOTE — PROGRESS NOTES
PHYSICAL / OCCUPATIONAL THERAPY - DAILY TREATMENT NOTE     Patient Name: Zohreh Koenig    Date: 2024    : 1965  Insurance: Payor:  EAST / Plan: [employer] EAST PRIME / Product Type: *No Product type* /      Patient  verified Yes     Visit #   Current / Total 15 24   Time   In / Out 350 438   Pain   In / Out 0 0   Subjective Functional Status/Changes: Pt reported that she is having trouble getting into see a urogyn MD   Changes to:  Allergies, Med Hx, Sx Hx?   no       TREATMENT AREA =  Lymphedema, not elsewhere classified [I89.0]    OBJECTIVE    Therapeutic Procedures:  Tx Min Billable or 1:1 Min (if diff from Tx Min) Procedure, Rationale, Specifics   10 10 11283 Therapeutic Exercise (timed):  increase ROM, strength, coordination, balance, and proprioception to improve patient's ability to progress to PLOF and address remaining functional goals. (see flow sheet as applicable)    Details if applicable:  up dated HEP     28 28 55385 Manual Therapy (timed):  decrease pain, increase ROM, increase tissue extensibility, and decrease edema to improve patient's ability to progress to PLOF and address remaining functional goals.  The manual therapy interventions were performed at a separate and distinct time from the therapeutic activities interventions . Details:  short neck sequence, right axial nodes, mid sagittal axial anastomosis, left axial nodes abdominal sequence, left upper back, left chest, using semi circles, pumps and scoops    Details if applicable:     10 10 93163 Self Care/Home Management (timed):  improve patient knowledge and understanding of diagnosis/prognosis, physical therapy expectations, procedures and progression, and Long term management  to improve patient's ability to progress to PLOF and address remaining functional goals.  (see flow sheet as applicable)     Details if applicable:     48 48 [facility] Totals Reminder: bill using total billable min of TIMED therapeutic procedures

## 2024-06-04 ENCOUNTER — APPOINTMENT (OUTPATIENT)
Facility: HOSPITAL | Age: 59
End: 2024-06-04
Payer: OTHER GOVERNMENT

## 2024-06-11 ENCOUNTER — APPOINTMENT (OUTPATIENT)
Facility: HOSPITAL | Age: 59
End: 2024-06-11
Payer: OTHER GOVERNMENT

## 2024-06-18 ENCOUNTER — APPOINTMENT (OUTPATIENT)
Facility: HOSPITAL | Age: 59
End: 2024-06-18
Payer: OTHER GOVERNMENT

## 2024-06-25 ENCOUNTER — APPOINTMENT (OUTPATIENT)
Facility: HOSPITAL | Age: 59
End: 2024-06-25
Payer: OTHER GOVERNMENT

## 2024-07-01 ENCOUNTER — HOSPITAL ENCOUNTER (OUTPATIENT)
Facility: HOSPITAL | Age: 59
Setting detail: RECURRING SERIES
Discharge: HOME | End: 2024-07-04
Payer: OTHER GOVERNMENT

## 2024-07-01 PROCEDURE — 97162 PT EVAL MOD COMPLEX 30 MIN: CPT

## 2024-07-01 PROCEDURE — 97535 SELF CARE MNGMENT TRAINING: CPT

## 2024-07-01 NOTE — PROGRESS NOTES
[facility]  [address] [place] [address]  Ph [phone]  Fx [phone]    Plan of Care/ Statement of Necessity for Physical Therapy Services    Patient name: Zohreh Koenig Start of Care: 2024   Referral source: Yulia Sanchez MD : 1965    Medical Diagnosis: Other symptoms and signs involving the genitourinary system [R39.89]   Onset Date: 2023    Treatment Diagnosis: Other symptoms and signs involving the genitourinary system [R39.89]   Prior Hospitalization: see medical history Provider#: [id number]   Medications: Verified on Patient summary List     Comorbidities: Complications related to surgery and Other: 2 pregnancies, 1 emergency c section , 1 vaginal with complete episiotomy , hx of breast cancer, double mastectomy with lymph node removal 2023, reconstruction surgery 2024, left upper extremity cellulitis, Hashimoto's, chronic eczema, varicose veins removed on right leg (), thyroid removal (benign tumor)      Prior Level of Function: decreased pain and urinary dysfunction           The Plan of Care and following information is based on the information from the initial evaluation.  Assessment/ key information: Patient is a 58 year old female presenting to Physical Therapy with c/o pain with bladder filling, incomplete bladder emptying, urinary urgency and frequency, and constipation which is limiting ability function at previous level. Patient also notes pain with intercourse, nocturia, and fecal leakage. Patient reported bladder pressure and urge to urinate with all active trunk ROM. Patient presents with decreased strength, coordination, and endurance of the pelvic floor muscles and decreased strength of postural stabilizers. Discussed with patient how internal pelvic floor assessment is performed upon consent, and patient stated interest for future treatment session. Patient presents with fair understanding of

## 2024-07-01 NOTE — PROGRESS NOTES
Physical Therapy Evaluation        Patient Name: Zohreh Koenig    Date: 2024    : 1965  Insurance: Payor:  EAST / Plan:  EAST PRIME / Product Type: *No Product type* /      Patient  verified yes     Visit #   Current / Total 1 12   Time   In / Out 7:54 8:36   Pain   In / Out 0 0   Subjective Functional Status/Changes: See eval   Changes to:  Meds, Allergies, Med Hx, Sx Hx?  If yes, update Summary List See eval     TREATMENT AREA =  Other symptoms and signs involving the genitourinary system [R39.89]    SUBJECTIVE  Pain Level (0-10 scale): 0  []constant []intermittent []improving []worsening []no change since onset    Any medication changes, allergies to medications, adverse drug reactions, diagnosis change, or new procedure performed?: [x] No    [] Yes   Subjective functional status/changes:     PLOF: decreased pain and urinary dysfunction  Limitations to PLOF: RLQ pain with bladder filling, incomplete bladder emptying, urinary urgency (increased with impact- stair ambulation and trunk movements), urinary frequency, constipation  Mechanism of Injury: initial onset 2 years ago, worsened after double mastectomy with lymph node removal 2023  Current symptoms/Complaints: RLQ pain with bladder filling, incomplete bladder emptying, urinary urgency and frequency, constipation  Previous Treatment/Compliance: no    PMHx/Surgical Hx: hx of breast cancer, double mastectomy with lymph node removal 2023, reconstruction surgery 2024, left upper extremity cellulitis, Hashimoto's, chronic eczema, varicose veins removed on right leg (), thyroid removal (benign tumor)   Pregnancies/ Births: 2 pregnancies, 1 emergency c section , 1 vaginal with complete episiotomy   Other Obstetrical/Gynecological History: left ovary and fallopian tube removed at 13 y/o    Work Hx:  for [employer]  Hobbies/Recreation: read, walks dog  Living Situation: 2 story house with

## 2024-07-16 ENCOUNTER — HOSPITAL ENCOUNTER (OUTPATIENT)
Facility: HOSPITAL | Age: 59
Setting detail: RECURRING SERIES
Discharge: HOME | End: 2024-07-19
Payer: OTHER GOVERNMENT

## 2024-07-16 PROCEDURE — 97110 THERAPEUTIC EXERCISES: CPT

## 2024-07-16 PROCEDURE — 97535 SELF CARE MNGMENT TRAINING: CPT

## 2024-07-16 PROCEDURE — 97112 NEUROMUSCULAR REEDUCATION: CPT

## 2024-07-16 PROCEDURE — 97530 THERAPEUTIC ACTIVITIES: CPT

## 2024-07-16 NOTE — PROGRESS NOTES
PHYSICAL / OCCUPATIONAL THERAPY - DAILY TREATMENT NOTE     Patient Name: Zohreh Koenig    Date: 2024    : 1965  Insurance: Payor:  EAST / Plan: [employer] EAST PRIME / Product Type: *No Product type* /      Patient  verified Yes     Visit #   Current / Total 2 12   Time   In / Out 5:56 6:50   Pain   In / Out 0 0   Subjective Functional Status/Changes: Patient consents to internal pelvic floor assessment   Changes to:  Allergies, Med Hx, Sx Hx?   no      TREATMENT AREA =  Other symptoms and signs involving the genitourinary system [R39.89]    OBJECTIVE      Therapeutic Procedures:  Tx Min Billable or 1:1 Min (if diff from Tx Min) Procedure, Rationale, Specifics   8  35288 Therapeutic Exercise (timed):  increase ROM, strength, coordination, balance, and proprioception to improve patient's ability to progress to PLOF and address remaining functional goals. (see flow sheet as applicable)    Details if applicable:       23  46829 Neuromuscular Re-Education (timed):  improve balance, coordination, kinesthetic sense, posture, core stability and proprioception to improve patient's ability to develop conscious control of individual muscles and awareness of position of extremities in order to progress to PLOF and address remaining functional goals. (see flow sheet as applicable)    Details if applicable:     12  64401 Therapeutic Activity (timed):  use of dynamic activities replicating functional movements to increase ROM, strength, coordination, balance, and proprioception in order to improve patient's ability to progress to PLOF and address remaining functional goals.  (see flow sheet as applicable)     Details if applicable:     11  24435 Self Care/Home Management (timed):  improve patient knowledge and understanding of pain reducing techniques, positioning, posture/ergonomics, home safety, and activity modification  to improve patient's ability to progress to PLOF and address remaining functional

## 2024-07-30 ENCOUNTER — HOSPITAL ENCOUNTER (OUTPATIENT)
Facility: HOSPITAL | Age: 59
Setting detail: RECURRING SERIES
End: 2024-07-30
Payer: OTHER GOVERNMENT

## 2024-07-30 NOTE — PROGRESS NOTES
breathing techniques to reduce strain on pelvic floor and organs contributing to symptoms.   Eval: patient unaware of toileting modifications to reduce strain; patient reports straining frequency of 3 times per week     Long term goals: To be achieved in 12 treatments:  Patient will report bladder continence 90% of the time with cough/sneeze/laugh, walking to the toilet, and ADLs requiring abdominal pressure change, such as transfers and lifting.   Eval: pt stress & urgency incontinence 3-4 times per week     Patient will reduce the number of daytime voids to every 3-4 hours to demonstrate successful bladder training program and improve productivity.   Eval: Patient reports 1 void every 1 hour       Patient will report decreased pain with bladder filling to 4/10 at worst to improve quality of life.   Eval: patient reports average daily pain 1, best day 0, worst pain 8     Patient will report 90% improvement in bowel dysfunction of constipation and fecal leakage and [place] scale 3-5 to improve QOL.   Eval: Patient reports bowel dysfunction of constipation and fecal leakage (slight stain, leaks without awareness), [place] scale 5, sometimes 4 and 6      Patient will demonstrate improvement of current complaints evidenced by an improvement in PFDI score by 45 points .  Eval: PFDI score: 147.92     Pt demonstrates independence with management tools & exercise program that are beneficial for current condition in order to feel comfortable with Pelvic floor PT D/C & not fear.  Eval: pt fearful of return to exercise & unaware of what activities to avoid to avoid exacerbation of current condition               PLAN  {YES/NO:07768}  Continue plan of care  []  Upgrade activities as tolerated  []  Discharge due to :  []  Other:    Rosy Abdalla PT    7/30/2024    10:58 AM    Future Appointments   Date Time Provider Department Center   7/30/2024  5:50 PM Rosy Abdalla, PT [facility]   8/6/2024  5:50 PM Rosy Abdalla, PT

## 2024-08-01 ENCOUNTER — HOSPITAL ENCOUNTER (OUTPATIENT)
Facility: HOSPITAL | Age: 59
Setting detail: RECURRING SERIES
Discharge: HOME | End: 2024-08-04
Payer: OTHER GOVERNMENT

## 2024-08-01 PROCEDURE — 97530 THERAPEUTIC ACTIVITIES: CPT

## 2024-08-01 PROCEDURE — 97140 MANUAL THERAPY 1/> REGIONS: CPT

## 2024-08-01 PROCEDURE — 97535 SELF CARE MNGMENT TRAINING: CPT

## 2024-08-01 NOTE — PROGRESS NOTES
PHYSICAL / OCCUPATIONAL THERAPY - DAILY TREATMENT NOTE (updated )    Patient Name: Zohreh Koenig    Date: 2024    : 1965  Insurance: Payor:  EAST / Plan:  EAST PRIME / Product Type: *No Product type* /      Patient  verified YES   Visit #   Current / Total 3 12   Time   In / Out 1:16 1:57   Pain   In / Out 0 0   Subjective Functional Status/Changes: Patient is a breast-cancer survivor.    She continues to have some urinary urge, but is improving. , Urogyn at [facility].    Is sandwiching coffee with water.    Changes to:  Meds, Allergies, Med Hx, Sx Hx?  If yes, update Summary List YES/NO     TREATMENT AREA =  Other symptoms and signs involving the genitourinary system [R39.89]    OBJECTIVE           Therapeutic Procedures:    Tx Min Billable or 1:1 Min (if diff from Tx Min) Procedure, Rationale, Specifics        23  86891 Therapeutic Activity (timed):  use of dynamic activities replicating functional movements to increase ROM, strength, coordination, balance, and proprioception in order to improve patient's ability to progress to PLOF and address remaining functional goals.  (see flow sheet as applicable)     Details if applicable:    []  Pelvic floor strengthening                 []  Pelvic floor downtraining  []  Quality pelvic floor contractions       []  Relaxation techniques  [x]  Urge suppression exercises  [x]  Other: Symptom re-assessment, progress note   10  45460 Self Care/Home Management (timed):  improve patient knowledge and understanding of activity modification, diagnosis/prognosis, and physical therapy expectations, procedures and progression  to improve patient's ability to progress to PLOF and address remaining functional goals.  (see flow sheet as applicable)     Details if applicable:    []  Pelvic floor strengthening                 []  Pelvic floor downtraining  []  Quality pelvic floor contractions       []  Relaxation techniques  []  Urge suppression 
training and musculoskeletal intervention. Patient's PFDI-20 score decreased significantly, meeting goal and indicating true functional change in symptoms. Patient is making progress towards goals. Patient will benefit from continued skilled Physical Therapy intervention to address remaining deficits and achieve goals to maximize function.        ASSESSMENT/RECOMMENDATIONS:   Patient would benefit from the continuation of skilled rehab interventions for functional progress to achieving above stated clinically significant goals. Continue per plan of care.       Thank you for this referral.   Andres Bhatia, PT 8/1/2024 8:18 AM

## 2024-08-06 ENCOUNTER — HOSPITAL ENCOUNTER (OUTPATIENT)
Facility: HOSPITAL | Age: 59
Setting detail: RECURRING SERIES
Discharge: HOME | End: 2024-08-09
Payer: OTHER GOVERNMENT

## 2024-08-06 PROCEDURE — 97535 SELF CARE MNGMENT TRAINING: CPT

## 2024-08-06 PROCEDURE — 97112 NEUROMUSCULAR REEDUCATION: CPT

## 2024-08-06 PROCEDURE — 97110 THERAPEUTIC EXERCISES: CPT

## 2024-08-06 PROCEDURE — 97530 THERAPEUTIC ACTIVITIES: CPT

## 2024-08-06 NOTE — PROGRESS NOTES
PHYSICAL / OCCUPATIONAL THERAPY - DAILY TREATMENT NOTE     Patient Name: Zohreh Koenig    Date: 2024    : 1965  Insurance: Payor: [employer] EAST / Plan:  EAST PRIME / Product Type: *No Product type* /      Patient  verified Yes     Visit #   Current / Total 4 12   Time   In / Out 6:00 6:38   Pain   In / Out 1 (RLQ) 0   Subjective Functional Status/Changes: Patient reports daily compliance with HEP  Feels like urine stream is improving and elevating knees has helped decreased straining for BM  Pt states imaging showed cyst on kidney, sees oncologist and urogynecologist on    Changes to:  Allergies, Med Hx, Sx Hx?   no     TREATMENT AREA =  Other symptoms and signs involving the genitourinary system [R39.89]    If an interpreting service is utilized for treatment of this patient, the contents of this document represent the material reviewed with the patient via the .     OBJECTIVE      Therapeutic Procedures:  Tx Min Billable or 1:1 Min (if diff from Tx Min) Procedure, Rationale, Specifics   10  04091 Therapeutic Exercise (timed):  increase ROM, strength, coordination, balance, and proprioception to improve patient's ability to progress to PLOF and address remaining functional goals. (see flow sheet as applicable)    Details if applicable:       10  69433 Neuromuscular Re-Education (timed):  improve balance, coordination, kinesthetic sense, posture, core stability and proprioception to improve patient's ability to develop conscious control of individual muscles and awareness of position of extremities in order to progress to PLOF and address remaining functional goals. (see flow sheet as applicable)    Details if applicable:     10  79927 Therapeutic Activity (timed):  use of dynamic activities replicating functional movements to increase ROM, strength, coordination, balance, and proprioception in order to improve patient's ability to progress to PLOF and address remaining

## 2024-08-13 ENCOUNTER — HOSPITAL ENCOUNTER (OUTPATIENT)
Facility: HOSPITAL | Age: 59
Setting detail: RECURRING SERIES
Discharge: HOME | End: 2024-08-16
Payer: OTHER GOVERNMENT

## 2024-08-13 PROCEDURE — 97112 NEUROMUSCULAR REEDUCATION: CPT

## 2024-08-13 PROCEDURE — 97530 THERAPEUTIC ACTIVITIES: CPT

## 2024-08-13 PROCEDURE — 97110 THERAPEUTIC EXERCISES: CPT

## 2024-08-13 PROCEDURE — 97535 SELF CARE MNGMENT TRAINING: CPT

## 2024-08-13 NOTE — PROGRESS NOTES
condition  PN 8/1/24: patient has more confidence in her ability to manage symptoms, but still limits based on urinary frequency and fear of bladder pain, progressing       PLAN  Yes  Continue plan of care  []  Upgrade activities as tolerated  []  Discharge due to :  []  Other:    Rosy Abdalla, PT    8/13/2024    11:46 AM    Future Appointments   Date Time Provider Department Center   8/13/2024  5:50 PM [place], PT [facility]   8/20/2024  5:50 PM [place], PT [facility]   8/27/2024  5:50 PM [place], PT [facility]   9/3/2024  5:50 PM [place]y, PT [facility]   9/10/2024  5:50 PM [place], PT [facility]   9/17/2024  5:10 PM [place]ian[place]uelRosy, PT [facility]   9/24/2024  5:50 PM [place], PT [facility]   10/1/2024  5:50 PM [place] Rosy, PT [facility]

## 2024-08-20 ENCOUNTER — HOSPITAL ENCOUNTER (OUTPATIENT)
Facility: HOSPITAL | Age: 59
Setting detail: RECURRING SERIES
Discharge: HOME | End: 2024-08-23
Payer: OTHER GOVERNMENT

## 2024-08-20 PROCEDURE — 97530 THERAPEUTIC ACTIVITIES: CPT

## 2024-08-20 PROCEDURE — 97110 THERAPEUTIC EXERCISES: CPT

## 2024-08-20 PROCEDURE — 97112 NEUROMUSCULAR REEDUCATION: CPT

## 2024-08-20 PROCEDURE — 97535 SELF CARE MNGMENT TRAINING: CPT

## 2024-08-20 NOTE — PROGRESS NOTES
PHYSICAL / OCCUPATIONAL THERAPY - DAILY TREATMENT NOTE     Patient Name: Zohreh Koenig    Date: 2024    : 1965  Insurance: Payor:  EAST / Plan:  EAST PRIME / Product Type: *No Product type* /      Patient  verified Yes     Visit #   Current / Total 6 12   Time   In / Out 5:22 6:16   Pain   In / Out 1-2 (RLQ) 0   Subjective Functional Status/Changes: Patient reports increased urinary frequency and RLQ pain since Friday, thinks may be delayed side effect of Anastrozole an messaged provider;  Has stopped drinking carbonated beverages and switched to decaf coffee;  \"Sleep longer\" activity modification gave relief and states exercises make pt feel better   Changes to:  Allergies, Med Hx, Sx Hx?   no     TREATMENT AREA =  Other symptoms and signs involving the genitourinary system [R39.89]    If an interpreting service is utilized for treatment of this patient, the contents of this document represent the material reviewed with the patient via the .     OBJECTIVE      Therapeutic Procedures:  Tx Min Billable or 1:1 Min (if diff from Tx Min) Procedure, Rationale, Specifics   10  08286 Therapeutic Exercise (timed):  increase ROM, strength, coordination, balance, and proprioception to improve patient's ability to progress to PLOF and address remaining functional goals. (see flow sheet as applicable)    Details if applicable:       23  18274 Neuromuscular Re-Education (timed):  improve balance, coordination, kinesthetic sense, posture, core stability and proprioception to improve patient's ability to develop conscious control of individual muscles and awareness of position of extremities in order to progress to PLOF and address remaining functional goals. (see flow sheet as applicable)    Details if applicable:     13  53815 Therapeutic Activity (timed):  use of dynamic activities replicating functional movements to increase ROM, strength, coordination, balance, and proprioception in

## 2024-08-27 ENCOUNTER — HOSPITAL ENCOUNTER (OUTPATIENT)
Facility: HOSPITAL | Age: 59
Setting detail: RECURRING SERIES
Discharge: HOME | End: 2024-08-30
Payer: OTHER GOVERNMENT

## 2024-08-27 PROCEDURE — 97112 NEUROMUSCULAR REEDUCATION: CPT

## 2024-08-27 PROCEDURE — 97530 THERAPEUTIC ACTIVITIES: CPT

## 2024-08-27 PROCEDURE — 97110 THERAPEUTIC EXERCISES: CPT

## 2024-08-27 NOTE — PROGRESS NOTES
PHYSICAL / OCCUPATIONAL THERAPY - DAILY TREATMENT NOTE     Patient Name: Zohreh Koenig    Date: 2024    : 1965  Insurance: Payor:  EAST / Plan: [employer] EAST PRIME / Product Type: *No Product type* /      Patient  verified Yes     Visit #   Current / Total 7 12   Time   In / Out 6:00 6:40   Pain   In / Out 1 0   Subjective Functional Status/Changes: Patient reports feeling much better this week, urge not as intense and less RLQ pain   Changes to:  Allergies, Med Hx, Sx Hx?   no     TREATMENT AREA =  Other symptoms and signs involving the genitourinary system [R39.89]    If an interpreting service is utilized for treatment of this patient, the contents of this document represent the material reviewed with the patient via the .     OBJECTIVE      Therapeutic Procedures:  Tx Min Billable or 1:1 Min (if diff from Tx Min) Procedure, Rationale, Specifics   9  26671 Therapeutic Exercise (timed):  increase ROM, strength, coordination, balance, and proprioception to improve patient's ability to progress to PLOF and address remaining functional goals. (see flow sheet as applicable)    Details if applicable:       23  16782 Neuromuscular Re-Education (timed):  improve balance, coordination, kinesthetic sense, posture, core stability and proprioception to improve patient's ability to develop conscious control of individual muscles and awareness of position of extremities in order to progress to PLOF and address remaining functional goals. (see flow sheet as applicable)    Details if applicable:     8  80946 Therapeutic Activity (timed):  use of dynamic activities replicating functional movements to increase ROM, strength, coordination, balance, and proprioception in order to improve patient's ability to progress to PLOF and address remaining functional goals.  (see flow sheet as applicable)     Details if applicable:           Details if applicable:            Details if applicable:     40   walking to the toilet, and ADLs requiring abdominal pressure change, such as transfers and lifting.   Eval: pt stress & urgency incontinence 3-4 times per week  PN 8/1/24: decreased frequency, progressing      Patient will reduce the number of daytime voids to every 3-4 hours to demonstrate successful bladder training program and improve productivity.   Eval: Patient reports 1 void every 1 hour    PN 8/1/24: 1x/ hour, no change     Patient will report decreased pain with bladder filling to 4/10 at worst to improve quality of life.   Eval: patient reports average daily pain 1, best day 0, worst pain 8  PN 8/1/24: patient reports bladder pain 2/10, progressing     Patient will report 90% improvement in bowel dysfunction of constipation and fecal leakage and Beltrami scale 3-5 to improve QOL.   Eval: Patient reports bowel dysfunction of constipation and fecal leakage (slight stain, leaks without awareness), Beltrami scale 5, sometimes 4 and 6   PN 8/1/24: improved constipation, no longer having leakage, looser stool but feeling like she's completely emptying, progressing      MET Patient will demonstrate improvement of current complaints evidenced by an improvement in PFDI score by 45 points .  Eval: PFDI score: 147.92  PN 8/1/24: PFDI-20 score: 50 MET     Pt demonstrates independence with management tools & exercise program that are beneficial for current condition in order to feel comfortable with Pelvic floor PT D/C & not fear.  Eval: pt fearful of return to exercise & unaware of what activities to avoid to avoid exacerbation of current condition  PN 8/1/24: patient has more confidence in her ability to manage symptoms, but still limits based on urinary frequency and fear of bladder pain, progressing       PLAN  Yes  Continue plan of care  []  Upgrade activities as tolerated  []  Discharge due to :  []  Other:    Rosy Abdalla, PT    8/27/2024    11:04 AM    Future Appointments   Date Time Provider Department Center

## 2024-09-03 ENCOUNTER — HOSPITAL ENCOUNTER (OUTPATIENT)
Facility: HOSPITAL | Age: 59
Setting detail: RECURRING SERIES
Discharge: HOME | End: 2024-09-06
Payer: OTHER GOVERNMENT

## 2024-09-03 PROCEDURE — 97535 SELF CARE MNGMENT TRAINING: CPT

## 2024-09-03 PROCEDURE — 97112 NEUROMUSCULAR REEDUCATION: CPT

## 2024-09-03 PROCEDURE — 97530 THERAPEUTIC ACTIVITIES: CPT

## 2024-09-03 PROCEDURE — 97110 THERAPEUTIC EXERCISES: CPT

## 2024-09-03 NOTE — PROGRESS NOTES
[facility]  [address] [place] [address]  Ph [phone]  Fx [phone]    Physical Therapy Progress Note  Patient name: Zohreh Koenig Start of Care: 2024   Referral source: Yulia Sanchez MD : 1965               Medical Diagnosis: Other symptoms and signs involving the genitourinary system [R39.89]    Onset Date: 2023               Treatment Diagnosis: Other symptoms and signs involving the genitourinary system [R39.89]   Prior Hospitalization: see medical history Provider#: [id number]   Medications: Verified on Patient summary List      Comorbidities: Complications related to surgery and Other: 2 pregnancies, 1 emergency c section , 1 vaginal with complete episiotomy , hx of breast cancer, double mastectomy with lymph node removal 2023, reconstruction surgery 2024, left upper extremity cellulitis, Hashimoto's, chronic eczema, varicose veins removed on right leg (), thyroid removal (benign tumor)       Prior Level of Function: decreased pain and urinary dysfunction    Visits from Start of Care: 8    Missed Visits: 0    Updated Goals/Measure of Progress:    Short term goals: To be achieved in 6 treatments:  MET Patient will report adherence to HEP as recommended for active participation and progression of interventions during therapy.  Eval: initiate at next visit  : HEP initiated  PN 24: compliant with HEP and implements strategies to manage symptoms, progressing  PN 9/3: patient reports compliance with HEP MET     Patient demonstrates proper dietary/fluid habits & urge suppression strategies that promote bladder & bowel health to aid in management of urinary urgency & incontinence.  Eval: Pt consuming insufficient amount of water & unaware of bladder fitness, dietary irritants & urge suppression strategies.  : 36-48 oz of water, patient educated on urge suppression Progressing  PN 24: at least 36+ ounces 
analyze and modify for postural abnormalities to address functional deficits and attain remaining goals.    Progress toward goals / Updated goals:  []  See Progress Note/Recertification    Short term goals: To be achieved in 6 treatments:  MET Patient will report adherence to HEP as recommended for active participation and progression of interventions during therapy.  Eval: initiate at next visit  7/16: HEP initiated  PN 8/1/24: compliant with HEP and implements strategies to manage symptoms, progressing  PN 9/3: patient reports compliance with HEP MET     Patient demonstrates proper dietary/fluid habits & urge suppression strategies that promote bladder & bowel health to aid in management of urinary urgency & incontinence.  Eval: Pt consuming insufficient amount of water & unaware of bladder fitness, dietary irritants & urge suppression strategies.  7/16: 36-48 oz of water, patient educated on urge suppression Progressing  PN 8/1/24: at least 36+ ounces water daily, utilizing urge suppression techniques, progressing  PN 9/3: 24 oz per day, patient reports self limiting due to urinary symptoms, pt encouraged to improve water intake and educated on appropriate hydration for bladder health     Patient will report urinating 0-1 time per night to improve quality of sleep.  Eval: Patient awakening 2-3 times per night to urinate.  PN 8/1/24: 1x, progressing  8/13: pt educated on \"sleep longer\" activity modification Progressing  8/20: patient reports compliance with activity medication, sleeping longer (waking after 6 hours instead of 4), and nocturia 1x per night Progressing  8/27: waking once every 6 hours, less pain when waking up in the mornings Progressing  PN 9/3: waking once every 6 hours, less pain when waking up in the mornings Progressing     Patient will report delaying urinary urge 10 to 15 minutes to improve QOL and productivity.       Eval: Patient reports immediate urge, unable to suppress  PN 8/1/24:

## 2024-09-10 ENCOUNTER — HOSPITAL ENCOUNTER (OUTPATIENT)
Facility: HOSPITAL | Age: 59
Setting detail: RECURRING SERIES
Discharge: HOME | End: 2024-09-13
Payer: OTHER GOVERNMENT

## 2024-09-10 PROCEDURE — 97110 THERAPEUTIC EXERCISES: CPT

## 2024-09-10 PROCEDURE — 97112 NEUROMUSCULAR REEDUCATION: CPT

## 2024-09-10 PROCEDURE — 97535 SELF CARE MNGMENT TRAINING: CPT

## 2024-09-10 PROCEDURE — 97530 THERAPEUTIC ACTIVITIES: CPT

## 2024-09-17 ENCOUNTER — HOSPITAL ENCOUNTER (OUTPATIENT)
Facility: HOSPITAL | Age: 59
Setting detail: RECURRING SERIES
Discharge: HOME | End: 2024-09-20
Payer: OTHER GOVERNMENT

## 2024-09-17 PROCEDURE — 97112 NEUROMUSCULAR REEDUCATION: CPT

## 2024-09-17 PROCEDURE — 97530 THERAPEUTIC ACTIVITIES: CPT

## 2024-09-17 PROCEDURE — 97110 THERAPEUTIC EXERCISES: CPT

## 2024-09-17 PROCEDURE — 97535 SELF CARE MNGMENT TRAINING: CPT

## 2024-09-24 ENCOUNTER — TELEPHONE (OUTPATIENT)
Facility: HOSPITAL | Age: 59
End: 2024-09-24

## 2024-09-24 ENCOUNTER — HOSPITAL ENCOUNTER (OUTPATIENT)
Facility: HOSPITAL | Age: 59
Setting detail: RECURRING SERIES
End: 2024-09-24
Payer: OTHER GOVERNMENT

## 2024-10-01 ENCOUNTER — HOSPITAL ENCOUNTER (OUTPATIENT)
Facility: HOSPITAL | Age: 59
Setting detail: RECURRING SERIES
Discharge: HOME | End: 2024-10-04
Payer: OTHER GOVERNMENT

## 2024-10-01 PROCEDURE — 97535 SELF CARE MNGMENT TRAINING: CPT

## 2024-10-01 PROCEDURE — 97110 THERAPEUTIC EXERCISES: CPT

## 2024-10-01 PROCEDURE — 97530 THERAPEUTIC ACTIVITIES: CPT

## 2024-10-01 PROCEDURE — 97112 NEUROMUSCULAR REEDUCATION: CPT

## 2024-10-16 ENCOUNTER — APPOINTMENT (OUTPATIENT)
Facility: HOSPITAL | Age: 59
End: 2024-10-16
Payer: OTHER GOVERNMENT